# Patient Record
Sex: MALE | URBAN - METROPOLITAN AREA
[De-identification: names, ages, dates, MRNs, and addresses within clinical notes are randomized per-mention and may not be internally consistent; named-entity substitution may affect disease eponyms.]

---

## 2018-08-10 ENCOUNTER — OFFICE VISIT (OUTPATIENT)
Dept: URBAN - METROPOLITAN AREA CLINIC 81 | Facility: CLINIC | Age: 28
End: 2018-08-10
Payer: COMMERCIAL

## 2018-08-10 DIAGNOSIS — H16.433: ICD-10-CM

## 2018-08-10 DIAGNOSIS — H52.13 MYOPIA, BILATERAL: Primary | ICD-10-CM

## 2018-08-10 PROCEDURE — 92015 DETERMINE REFRACTIVE STATE: CPT | Performed by: OPTOMETRIST

## 2018-08-10 PROCEDURE — 92012 INTRM OPH EXAM EST PATIENT: CPT | Performed by: OPTOMETRIST

## 2018-08-10 ASSESSMENT — VISUAL ACUITY
OD: 20/20
OS: 20/20

## 2018-08-10 ASSESSMENT — KERATOMETRY
OS: 43.63
OD: 43.88

## 2018-08-10 ASSESSMENT — INTRAOCULAR PRESSURE
OD: 20
OS: 18

## 2018-08-10 NOTE — IMPRESSION/PLAN
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 Plan: Discussed Dx in detail. Pt understands the importance of more limited CL wear time, the need for proper cleaning/hygiene habits and regular replacement of CL's.

## 2018-08-10 NOTE — IMPRESSION/PLAN
Impression: Myopia, bilateral: H52.13. Plan: Glasses Rx update given. Recommend UV protection. Discussed adaptation, pt understands. Pt prefers to keep same CL parameters (Acuvue) as currently using, if possible.

## 2018-08-20 ENCOUNTER — OFFICE VISIT (OUTPATIENT)
Dept: URBAN - METROPOLITAN AREA CLINIC 81 | Facility: CLINIC | Age: 28
End: 2018-08-20

## 2018-08-20 PROCEDURE — 92310 CONTACT LENS FITTING OU: CPT | Performed by: OPTOMETRIST
